# Patient Record
(demographics unavailable — no encounter records)

---

## 2024-11-05 NOTE — CURRENT MEDS
[Takes medication as prescribed] : takes [None] : Patient does not have any barriers to medication adherence [Yes] : Reviewed medication list for presence of high-risk medications. [Blood Thinners] : blood thinners [FreeTextEntry1] : Eliquis

## 2024-11-05 NOTE — HISTORY OF PRESENT ILLNESS
[Home] : at home, [unfilled] , at the time of the visit. [Other Location: e.g. Home (Enter Location, City,State)___] : at [unfilled] [Verbal consent obtained from patient] : the patient, [unfilled] [FreeTextEntry1] : F/u post hospitalization at [facility] from 10/17 - 25 for CHF [de-identified] : This patient is Enrolled in the Post-Discharge [facility] Follow Up Program through [facility] for Continuity of Care S/P Recent Hospitalization until seen by PCP.  This is a 84YOF with a PMHx of A.fib on Eliquis, CKD, HTN, HLD, CKD, CHF, bradycardia s/p PPM who was admitted to [place] for dyspnea on exertion and found to have a worsened EF. She was being diuresis there with IV lasix 40mg BID and transferred here for a cardiac cath due to worsened EF on echo. Accepted by Dr. Riley.  On exam, patient states no complaints. Hospital Course This is a 84YOF with a PMHx of A.fib on Eliquis, CKD, HTN, HLD, CKD, CHF, bradycardia s/p PPM who was admitted to [place] for dyspnea on exertion and found to have a worsened EF. She was being  diuresed there with IV lasix 40mg BID and transferred here for a cardiac cath due to worsened EF on echo. Accepted by Dr. Riley.  Otherwise, the patient is hemodynamically stable on supplemental oxygen. Upon admission patient was severely volume overloaded and unable to tolerate lying flat. This postponed catheterization and patient was started on more aggressive diuresis with IV Lasix. A  developing metabolic alkalosis was medically managed and patient remained stable during course of admission. She was evaluated daily by Cardiology, CHF, and EP given her comorbidities. Her GDMT was started and rearranged until discharge with help of cardiology. Once her fluid status improved catheterization was attempted however unsuccessful following inability to find access and she was sent back to the floors for continued diuresis. Eventually patient went back to catheterization with success however following cath a code stroke was called concerning patients change in mentation. Work up for stroke was negative and it was suspected that the change in mentation was due to the large number of sedatives used during procedure as with fluids and food patient was back at her baseline. No acute cardiac intervention followed and patients' medications were adjusted for best out patient optimization with planned follow up with her cardiologist and CHF. Patient was also sent home with services to better manage her conditions given her family did not want her sent to rehab  and wanted her home. Televisit made with pt and her dgtr Yocasta. Pt is alert and oriented x3. States pt has been doing much better since being home. Dgtr reports pt has pacemaker upgrade on Friday. Pre procedure labs were done today. Pt states she is taking all medications as prescribed. She is now confined to bed, due to being deconditioned, unable to weigh, denies edema. Unable to get out to PCP office but dgtr plans to get a tele visit with pt's PCP, wants labs sent to him. Instructed dgtr to give pt's cardiologist the PCP's fax number for them to send the results.   TCM NP reviewed that pt is under the [facility] program for home care until pt is seen by PCP. TCM NP will be assigned to sign Home Care orders post-discharge

## 2024-11-05 NOTE — PHYSICAL EXAM
[No Acute Distress] : no acute distress [Normal Outer Ear/Nose] : the outer ears and nose were normal in appearance [Normal Sclera/Conjunctiva] : normal sclera/conjunctiva [No JVD] : no jugular venous distention [No Respiratory Distress] : no respiratory distress  [No Accessory Muscle Use] : no accessory muscle use [No Rash] : no rash [No Skin Lesions] : no skin lesions [Normal] : affect was normal and insight and judgment were intact

## 2024-11-05 NOTE — REVIEW OF SYSTEMS
[Constipation] : constipation [Muscle Weakness] : muscle weakness [Muscle Pain] : muscle pain [Unsteady Walking] : ataxia [Negative] : Heme/Lymph [Fever] : no fever [Chills] : no chills [Hearing Loss] : no hearing loss [Chest Pain] : no chest pain [Lower Ext Edema] : no lower extremity edema [Shortness Of Breath] : no shortness of breath [Cough] : no cough [Joint Pain] : no joint pain [Back Pain] : no back pain [Itching] : no itching [Skin Rash] : no skin rash [Headache] : no headache [Insomnia] : no insomnia [FreeTextEntry9] : legs [de-identified] : confined to bed

## 2024-11-05 NOTE — PHYSICAL EXAM
[No Acute Distress] : no acute distress [Normal Sclera/Conjunctiva] : normal sclera/conjunctiva [Normal Outer Ear/Nose] : the outer ears and nose were normal in appearance [No JVD] : no jugular venous distention [No Respiratory Distress] : no respiratory distress  [No Accessory Muscle Use] : no accessory muscle use [No Rash] : no rash [No Skin Lesions] : no skin lesions [Normal] : affect was normal and insight and judgment were intact

## 2024-11-05 NOTE — ASSESSMENT
[FreeTextEntry1] : This is a 84YOF with a PMHx of A.fib on Eliquis, CKD, HTN, HLD, CKD, CHF, bradycardia s/p PPM who was admitted to [place] for dyspnea on exertion and found to have a worsened EF. She was being  diuresis there with IV Lasix 40mg BID and transferred here for a cardiac cath due to worsened EF on echo. Accepted by Dr. Riley.  Otherwise, the patient is hemodynamically stable on supplemental oxygen. Upon admission patient was severely volume overloaded and unable to tolerate lying flat. This postponed catheterization and patient was started on more aggressive diuresis with IV Lasix. A developing metabolic alkalosis was medically managed, and patient remained stable during course of admission. She was evaluated daily by Cardiology, CHF, and EP given her comorbidities. Her GDMT was started and rearranged until discharge with help of cardiology. Once her fluid status improved catheterization was attempted however unsuccessful following inability to find access and she was sent back to the floors for continued diuresis. Eventually patient went back to catheterization with success however following cath a code stroke was called concerning patients change in mentation.

## 2024-11-05 NOTE — REVIEW OF SYSTEMS
[Constipation] : constipation [Muscle Weakness] : muscle weakness [Muscle Pain] : muscle pain [Unsteady Walking] : ataxia [Negative] : Heme/Lymph [Fever] : no fever [Chills] : no chills [Hearing Loss] : no hearing loss [Chest Pain] : no chest pain [Lower Ext Edema] : no lower extremity edema [Shortness Of Breath] : no shortness of breath [Cough] : no cough [Joint Pain] : no joint pain [Back Pain] : no back pain [Itching] : no itching [Skin Rash] : no skin rash [Headache] : no headache [Insomnia] : no insomnia [FreeTextEntry9] : legs [de-identified] : confined to bed

## 2024-11-05 NOTE — ASSESSMENT
Her and her Grandson got covid on 12/11/22  Her MWV on 12/20/22 will be ok    [FreeTextEntry1] : This is a 84YOF with a PMHx of A.fib on Eliquis, CKD, HTN, HLD, CKD, CHF, bradycardia s/p PPM who was admitted to [place] for dyspnea on exertion and found to have a worsened EF. She was being  diuresis there with IV Lasix 40mg BID and transferred here for a cardiac cath due to worsened EF on echo. Accepted by Dr. Riley.  Otherwise, the patient is hemodynamically stable on supplemental oxygen. Upon admission patient was severely volume overloaded and unable to tolerate lying flat. This postponed catheterization and patient was started on more aggressive diuresis with IV Lasix. A developing metabolic alkalosis was medically managed, and patient remained stable during course of admission. She was evaluated daily by Cardiology, CHF, and EP given her comorbidities. Her GDMT was started and rearranged until discharge with help of cardiology. Once her fluid status improved catheterization was attempted however unsuccessful following inability to find access and she was sent back to the floors for continued diuresis. Eventually patient went back to catheterization with success however following cath a code stroke was called concerning patients change in mentation.

## 2024-11-05 NOTE — PLAN
[FreeTextEntry1] : 1. keep f/u with PCP 2. cont all medications as prescribed  3.keep f/u for pacemaker upgrade 4. maintain diet -diabetic, cardiac

## 2024-11-05 NOTE — HISTORY OF PRESENT ILLNESS
[Home] : at home, [unfilled] , at the time of the visit. [Other Location: e.g. Home (Enter Location, City,State)___] : at [unfilled] [Verbal consent obtained from patient] : the patient, [unfilled] [FreeTextEntry1] : F/u post hospitalization at [facility] from 10/17 - 25 for CHF [de-identified] : This patient is Enrolled in the Post-Discharge [facility] Follow Up Program through [facility] for Continuity of Care S/P Recent Hospitalization until seen by PCP.  This is a 84YOF with a PMHx of A.fib on Eliquis, CKD, HTN, HLD, CKD, CHF, bradycardia s/p PPM who was admitted to [place] for dyspnea on exertion and found to have a worsened EF. She was being diuresis there with IV lasix 40mg BID and transferred here for a cardiac cath due to worsened EF on echo. Accepted by Dr. Riley.  On exam, patient states no complaints. Hospital Course This is a 84YOF with a PMHx of A.fib on Eliquis, CKD, HTN, HLD, CKD, CHF, bradycardia s/p PPM who was admitted to [place] for dyspnea on exertion and found to have a worsened EF. She was being  diuresed there with IV lasix 40mg BID and transferred here for a cardiac cath due to worsened EF on echo. Accepted by Dr. Riley.  Otherwise, the patient is hemodynamically stable on supplemental oxygen. Upon admission patient was severely volume overloaded and unable to tolerate lying flat. This postponed catheterization and patient was started on more aggressive diuresis with IV Lasix. A  developing metabolic alkalosis was medically managed and patient remained stable during course of admission. She was evaluated daily by Cardiology, CHF, and EP given her comorbidities. Her GDMT was started and rearranged until discharge with help of cardiology. Once her fluid status improved catheterization was attempted however unsuccessful following inability to find access and she was sent back to the floors for continued diuresis. Eventually patient went back to catheterization with success however following cath a code stroke was called concerning patients change in mentation. Work up for stroke was negative and it was suspected that the change in mentation was due to the large number of sedatives used during procedure as with fluids and food patient was back at her baseline. No acute cardiac intervention followed and patients' medications were adjusted for best out patient optimization with planned follow up with her cardiologist and CHF. Patient was also sent home with services to better manage her conditions given her family did not want her sent to rehab  and wanted her home. Televisit made with pt and her dgtr Yocasta. Pt is alert and oriented x3. States pt has been doing much better since being home. Dgtr reports pt has pacemaker upgrade on Friday. Pre procedure labs were done today. Pt states she is taking all medications as prescribed. She is now confined to bed, due to being deconditioned, unable to weigh, denies edema. Unable to get out to PCP office but dgtr plans to get a tele visit with pt's PCP, wants labs sent to him. Instructed dgtr to give pt's cardiologist the PCP's fax number for them to send the results.   TCM NP reviewed that pt is under the [facility] program for home care until pt is seen by PCP. TCM NP will be assigned to sign Home Care orders post-discharge